# Patient Record
Sex: FEMALE | Race: WHITE
[De-identification: names, ages, dates, MRNs, and addresses within clinical notes are randomized per-mention and may not be internally consistent; named-entity substitution may affect disease eponyms.]

---

## 2020-04-02 ENCOUNTER — HOSPITAL ENCOUNTER (OUTPATIENT)
Dept: HOSPITAL 96 - M.SUR | Age: 40
Discharge: HOME | End: 2020-04-02
Attending: INTERNAL MEDICINE
Payer: COMMERCIAL

## 2020-04-02 DIAGNOSIS — Z88.2: ICD-10-CM

## 2020-04-02 DIAGNOSIS — Z79.899: ICD-10-CM

## 2020-04-02 DIAGNOSIS — R12: ICD-10-CM

## 2020-04-02 DIAGNOSIS — K44.9: ICD-10-CM

## 2020-04-02 DIAGNOSIS — R19.7: ICD-10-CM

## 2020-04-02 DIAGNOSIS — R10.30: Primary | ICD-10-CM

## 2020-04-02 DIAGNOSIS — Z98.890: ICD-10-CM

## 2020-04-02 DIAGNOSIS — K21.0: ICD-10-CM

## 2020-04-02 DIAGNOSIS — K22.8: ICD-10-CM

## 2020-04-02 LAB
HCT VFR BLD CALC: 42.4 % (ref 37–47)
HGB BLD-MCNC: 14.6 GM/DL (ref 12–15)

## 2020-04-02 NOTE — PROC
[address]  [address]                    PROCEDURE REPORT              
_______________________________________________________________________________
 
Name:       PEGGY GONZALEZ                 Room:                      [facility]#:  [id number]      Account #:      [id number]  
Admission:  04/02/20     Attend Phys:    Judy Balbuena MD   
Discharge:               Date of Birth:  [birth year]  
         Report #: [id number]
                                                                                
_______________________________________________________________________________
THIS REPORT FOR:  //name//                      
 
cc:  Gracie Lee Ann FNPC                                                      ~
THIS REPORT FOR:  //name//                      
 
For GI report, please see Provation report in Per[employer] content.
 
 
 
 
 
 
 
 
 
 
 
 
 
 
 
 
 
 
 
 
 
 
 
 
 
 
 
 
 
 
 
 
 
 
 
 
 
                       
                                        By:                                
                 
D: 04/02/20     _______________________________________
T: 04/03/20 [facility]6Medical Records Staff SHAR       /AL

## 2020-04-02 NOTE — EKG
[place] [address]
Phone:  [phone]                     ELECTROCARDIOGRAM REPORT      
_______________________________________________________________________________
 
Name:         PEGGY GONZALEZ                Room:                     [facility]#:    [id number]     Account #:     [id number]  
Admission:    20    Attend Phys:   Judy Balbuena MD
Discharge:                Date of Birth: [birth year]  
Date of Service: 20 0833  Report #:      [id number]
        [id number]
_______________________________________________________________________________
THIS REPORT FOR:  //name//                      
 
                          [facility]
                                       
Test Date:    2020               Test Time:    08:33:32
Pat Name:     PEGGY GONZALEZ             Department:   
Patient ID:   [id number]            Room:          
Gender:       F                        Technician:   
:          1980               Requested By: Judy Balbuena
Order Number: [id number]    Reading MD:   Mike Coates
                                 Measurements
Intervals                              Axis          
Rate:         101                      P:            36
VA:           118                      QRS:          16
QRSD:         89                       T:            1
QT:           367                                    
QTc:          476                                    
                           Interpretive Statements
Sinus tachycardia
Borderline T abnormalities, inferior leads
Baseline wander in lead(s) I,II,aVR
No previous ECG available for comparison
 
Electronically Signed On 2020 12:51:36 CDT by Mike Coates
[email or web address]
 
 
 
 
 
 
 
 
 
 
 
 
 
 
 
 
 
 
 
  <ELECTRONICALLY SIGNED>
                                           By: Mike Coates MD, [facility]     
  20     1251
D: 20 0833   _____________________________________
T: 20 0833   Mike Coates MD, [facility]       /EPI